# Patient Record
Sex: FEMALE | Race: OTHER
[De-identification: names, ages, dates, MRNs, and addresses within clinical notes are randomized per-mention and may not be internally consistent; named-entity substitution may affect disease eponyms.]

---

## 2019-04-24 ENCOUNTER — HOSPITAL ENCOUNTER (INPATIENT)
Dept: HOSPITAL 54 - DS | Age: 59
LOS: 2 days | Discharge: HOME HEALTH | DRG: 467 | End: 2019-04-26
Attending: SPECIALIST | Admitting: SPECIALIST
Payer: COMMERCIAL

## 2019-04-24 VITALS — HEIGHT: 63 IN | WEIGHT: 149 LBS | BODY MASS INDEX: 26.4 KG/M2

## 2019-04-24 VITALS — SYSTOLIC BLOOD PRESSURE: 126 MMHG | DIASTOLIC BLOOD PRESSURE: 68 MMHG

## 2019-04-24 VITALS — DIASTOLIC BLOOD PRESSURE: 71 MMHG | SYSTOLIC BLOOD PRESSURE: 124 MMHG

## 2019-04-24 VITALS — DIASTOLIC BLOOD PRESSURE: 71 MMHG | SYSTOLIC BLOOD PRESSURE: 126 MMHG

## 2019-04-24 DIAGNOSIS — D72.829: ICD-10-CM

## 2019-04-24 DIAGNOSIS — M17.11: Primary | ICD-10-CM

## 2019-04-24 DIAGNOSIS — D68.59: ICD-10-CM

## 2019-04-24 DIAGNOSIS — E78.5: ICD-10-CM

## 2019-04-24 DIAGNOSIS — E11.9: ICD-10-CM

## 2019-04-24 DIAGNOSIS — E87.6: ICD-10-CM

## 2019-04-24 PROCEDURE — G0378 HOSPITAL OBSERVATION PER HR: HCPCS

## 2019-04-24 PROCEDURE — L1830 KO IMMOB CANVAS LONG PRE OTS: HCPCS

## 2019-04-24 PROCEDURE — A4217 STERILE WATER/SALINE, 500 ML: HCPCS

## 2019-04-24 PROCEDURE — A6402 STERILE GAUZE <= 16 SQ IN: HCPCS

## 2019-04-24 PROCEDURE — C1713 ANCHOR/SCREW BN/BN,TIS/BN: HCPCS

## 2019-04-24 RX ADMIN — INSULIN HUMAN PRN UNIT: 100 INJECTION, SOLUTION PARENTERAL at 22:47

## 2019-04-24 RX ADMIN — DEXTROSE MONOHYDRATE SCH MLS/HR: 50 INJECTION, SOLUTION INTRAVENOUS at 20:20

## 2019-04-24 RX ADMIN — Medication PRN MG: at 19:09

## 2019-04-24 RX ADMIN — Medication SCH MG: at 18:38

## 2019-04-24 RX ADMIN — SODIUM CHLORIDE, SODIUM LACTATE, POTASSIUM CHLORIDE, AND CALCIUM CHLORIDE PRN MLS/HR: .6; .31; .03; .02 INJECTION, SOLUTION INTRAVENOUS at 19:07

## 2019-04-24 RX ADMIN — HYDROCODONE BITARTRATE AND ACETAMINOPHEN PRN EA: 10; 325 TABLET ORAL at 17:26

## 2019-04-24 RX ADMIN — Medication SCH EACH: at 21:43

## 2019-04-24 RX ADMIN — FAMOTIDINE SCH MG: 20 TABLET, FILM COATED ORAL at 20:27

## 2019-04-24 RX ADMIN — Medication PRN MG: at 22:21

## 2019-04-24 NOTE — NUR
TELE RN CLOSING NOTES

PT IS AWAKE ORIENTED X4. RESPIRATION IS EQUAL, CLEAR AND UNLABORED. IV IS INTACT, PATENT AND 
INFUSING WELL. HAYWOOD CATHERER IS PATENT, INTACT AND DRAINING. PAIN HAS BEEN MANAGED WITH 
PAIN MANAGEMENT AND REPOSITIONING. ALL MEDS HAS BEEN GIVEN.  'S PT IS AT THE BED 
SIDE. CALL LIGHT IS WITHIN REACH. BED AT LOW POSITION.

## 2019-04-24 NOTE — NUR
MS/RN OPENING NOTES

RECEIVED PATIENT IN BED, AWAKE,ALERT, WITH GRIMACE, REPORTED PAIN OF 8/10, LAST MEDICATION 
GIVEN LAST HOUR. BUT ABLE TO TOLERATE PER PATIENT, CALM AND COOPERATIVE, RESPIRATIONS EVEN 
AND UNLABORED. BED LOCKED CALL LIGHTS WITHIN REACH, WILL MONITOR.

## 2019-04-24 NOTE — NUR
RN TELE NOTES

RECEIVED PT FROM OR STAFF VIA BED. PT IS AWAKE, ALERT AND ORIENTED X4. RESPIRATION IS 
NORMAL, EQUAL AND UNLABORED. ROOM SET-UP ORIENTATION PROVIDED. VERBALIZE UNDERSTANDING. PT 
FAMILY AT THE BED SIDE. NO PAIN PRESENT AT THIS TIME. BED AT LOW POSITION AND CALL LIGHT IS 
WITHIN REACH. WILL CONTINUE TO MONITOR.

## 2019-04-24 NOTE — NUR
MS/RN NOTES

PAIN REPORTED ADMINISTERED IM MORPHINE AS PRESCRIBED 4MG TO MONITOR.ALERT, ORIENTED X3.

## 2019-04-25 VITALS — DIASTOLIC BLOOD PRESSURE: 56 MMHG | SYSTOLIC BLOOD PRESSURE: 95 MMHG

## 2019-04-25 VITALS — DIASTOLIC BLOOD PRESSURE: 55 MMHG | SYSTOLIC BLOOD PRESSURE: 113 MMHG

## 2019-04-25 VITALS — DIASTOLIC BLOOD PRESSURE: 56 MMHG | SYSTOLIC BLOOD PRESSURE: 106 MMHG

## 2019-04-25 LAB
ALBUMIN SERPL BCP-MCNC: 3.1 G/DL (ref 3.4–5)
ALP SERPL-CCNC: 59 U/L (ref 46–116)
ALT SERPL W P-5'-P-CCNC: 25 U/L (ref 12–78)
AST SERPL W P-5'-P-CCNC: 18 U/L (ref 15–37)
BASOPHILS # BLD AUTO: 0 /CMM (ref 0–0.2)
BASOPHILS NFR BLD AUTO: 0.3 % (ref 0–2)
BILIRUB SERPL-MCNC: 0.4 MG/DL (ref 0.2–1)
BUN SERPL-MCNC: 10 MG/DL (ref 7–18)
CALCIUM SERPL-MCNC: 8.3 MG/DL (ref 8.5–10.1)
CHLORIDE SERPL-SCNC: 105 MMOL/L (ref 98–107)
CHOLEST SERPL-MCNC: 141 MG/DL (ref ?–200)
CO2 SERPL-SCNC: 24 MMOL/L (ref 21–32)
CREAT SERPL-MCNC: 0.6 MG/DL (ref 0.6–1.3)
EOSINOPHIL NFR BLD AUTO: 0.1 % (ref 0–6)
GLUCOSE SERPL-MCNC: 148 MG/DL (ref 74–106)
HCT VFR BLD AUTO: 37 % (ref 33–45)
HDLC SERPL-MCNC: 42 MG/DL (ref 40–60)
HGB BLD-MCNC: 12.5 G/DL (ref 11.5–14.8)
LDLC SERPL DIRECT ASSAY-MCNC: 96 MG/DL (ref 0–99)
LYMPHOCYTES NFR BLD AUTO: 18.8 % (ref 20–44)
LYMPHOCYTES NFR BLD AUTO: 2.5 /CMM (ref 0.8–4.8)
MAGNESIUM SERPL-MCNC: 2.2 MG/DL (ref 1.8–2.4)
MCHC RBC AUTO-ENTMCNC: 34 G/DL (ref 31–36)
MCV RBC AUTO: 89 FL (ref 82–100)
MONOCYTES NFR BLD AUTO: 1.6 /CMM (ref 0.1–1.3)
MONOCYTES NFR BLD AUTO: 12.1 % (ref 2–12)
NEUTROPHILS # BLD AUTO: 9.1 /CMM (ref 1.8–8.9)
NEUTROPHILS NFR BLD AUTO: 68.7 % (ref 43–81)
PHOSPHATE SERPL-MCNC: 3.5 MG/DL (ref 2.5–4.9)
PLATELET # BLD AUTO: 263 /CMM (ref 150–450)
POTASSIUM SERPL-SCNC: 3.8 MMOL/L (ref 3.5–5.1)
PROT SERPL-MCNC: 6.6 G/DL (ref 6.4–8.2)
RBC # BLD AUTO: 4.16 MIL/UL (ref 4–5.2)
SODIUM SERPL-SCNC: 137 MMOL/L (ref 136–145)
TRIGL SERPL-MCNC: 83 MG/DL (ref 30–150)
TSH SERPL DL<=0.005 MIU/L-ACNC: 1.69 UIU/ML (ref 0.36–3.74)
WBC NRBC COR # BLD AUTO: 13.3 K/UL (ref 4.3–11)

## 2019-04-25 RX ADMIN — Medication SCH EACH: at 21:43

## 2019-04-25 RX ADMIN — HYDROCODONE BITARTRATE AND ACETAMINOPHEN PRN EA: 10; 325 TABLET ORAL at 05:41

## 2019-04-25 RX ADMIN — DEXTROSE MONOHYDRATE SCH MLS/HR: 50 INJECTION, SOLUTION INTRAVENOUS at 04:51

## 2019-04-25 RX ADMIN — Medication SCH EACH: at 17:19

## 2019-04-25 RX ADMIN — Medication SCH MG: at 16:42

## 2019-04-25 RX ADMIN — INSULIN HUMAN PRN UNIT: 100 INJECTION, SOLUTION PARENTERAL at 21:49

## 2019-04-25 RX ADMIN — Medication PRN MG: at 18:13

## 2019-04-25 RX ADMIN — Medication PRN MG: at 05:31

## 2019-04-25 RX ADMIN — FAMOTIDINE SCH MG: 20 TABLET, FILM COATED ORAL at 09:08

## 2019-04-25 RX ADMIN — FAMOTIDINE SCH MG: 20 TABLET, FILM COATED ORAL at 21:43

## 2019-04-25 RX ADMIN — Medication SCH EACH: at 05:49

## 2019-04-25 RX ADMIN — HYDROCODONE BITARTRATE AND ACETAMINOPHEN PRN EA: 10; 325 TABLET ORAL at 16:43

## 2019-04-25 RX ADMIN — INSULIN HUMAN PRN UNIT: 100 INJECTION, SOLUTION PARENTERAL at 18:33

## 2019-04-25 RX ADMIN — Medication SCH EACH: at 13:16

## 2019-04-25 RX ADMIN — Medication PRN MG: at 01:28

## 2019-04-25 RX ADMIN — SODIUM CHLORIDE, SODIUM LACTATE, POTASSIUM CHLORIDE, AND CALCIUM CHLORIDE PRN MLS/HR: .6; .31; .03; .02 INJECTION, SOLUTION INTRAVENOUS at 14:00

## 2019-04-25 RX ADMIN — RIVAROXABAN SCH MG: 10 TABLET, FILM COATED ORAL at 16:43

## 2019-04-25 RX ADMIN — INSULIN HUMAN PRN UNIT: 100 INJECTION, SOLUTION PARENTERAL at 06:56

## 2019-04-25 RX ADMIN — Medication SCH MG: at 09:08

## 2019-04-25 RX ADMIN — HYDROCODONE BITARTRATE AND ACETAMINOPHEN PRN EA: 10; 325 TABLET ORAL at 13:16

## 2019-04-25 RX ADMIN — HYDROCODONE BITARTRATE AND ACETAMINOPHEN PRN EA: 10; 325 TABLET ORAL at 09:10

## 2019-04-25 NOTE — NUR
324-1

MS/RN NOTES

PATIENT IN BED, HOB ELEVATED, SLEPT INTERMITENTLY, ON PAIN MANAGMENT MONITORING, PROVIDED 
SNACKS

KEPT COMFORTABLE, RESPIRATIONS EVEN AND UNLABORED, ON 2 LITER OXYGEN VIA NC. WILL MONITOR.

## 2019-04-25 NOTE — NUR
POD#1 s/p right TKA. Met with patient and spouse Evens at bedside. She lives with her spouse 
in a single level dwelling in [place]. Prior to hospitalization, she was ambulatory 
and independent with adl's. Offered ARU for rehab but patient want to go home when 
discharge. Her  is her primary source of support and caregiver. Spoke with Agnes at 
[facility] [phone], patient DME delivered to home already - walker, shower 
chair, raised toilet seat,and cooling therapy.CPM machine will be delivered to home on day 
of discharge according to Agnes. Homehealth was also pre-arranged/set up with [facility] 
[phone]. Homehealth to start service a day after discharge. Patient spouse will provide 
transportation to home when discharge. 










-------------------------------------------------------------------------------

Addendum: 04/25/19 at 2312 by ROWENA SANTOS RN

-------------------------------------------------------------------------------

Amended: Links added.

## 2019-04-25 NOTE — NUR
MS LVN NOTES

 PT RESTING BUT AROUSE TO TOUCH ROUTINE MEDS GIVEN AND BLOOD SUGAR CHECKED DONE 151 .  2 
UNITS OF INSULIN GIVEN JORY SQ AS ORDERED. SNACKS ALSO SERVED. KEPT HER WARM AND COMFORTABLE 
AT ALL TIMES. WILL CONTINUE MONITORING.PLACE CALL LIGHT AT REACH.

## 2019-04-25 NOTE — NUR
MS/RN NOTES

PAIN OF 8/10 TO MONITOR, WITH NOCO PO BREAK THROUGH  MG PO. MD WINKLER MADE AWARE AND TO 
KEEP MONITORING PATIETN AND GIVE AS NEEDED PAIN MEDICATION FOR RELIEF IN TIME.

## 2019-04-25 NOTE — NUR
HD RN

RECEIVED ON BED, AWAKE,ALERT,ORIENTED X 4,NOT IN ANY FORM OF DISTRESS, RESPIRATIONS EVEN AND 
UNLABORED,NO SOB NOTED, LUNGS ARE DIMINISHED, ABDOMEN SOFT,POSITIVE BOWEL SOUNDS,DENIES PAIN 
AT THIS TIME,ALL NEEDS ATTENDED.

## 2019-04-25 NOTE — NUR
MS LVN INITIAL NOTES

 SEEN PT IN BED AWAKE AND ALERT TALKING TO HER  AT THE BEDSIDE. DENIES ANY PAIN OR 
ANY DISCOMFORT. WITH KNEE IMMOBILIZER ON HER RIGHT LOWER LEGS. WITH ICE PACK FOR PT COMFORT. 
LR  ML/HR INFUSING ON HER LEFT HAND PATENT AND INTACT. ENCOURAGE PT TO USED THE CALL 
LIGHT IF SHE NEEDS SOME HELPED. KEPT HER WARM AND COMFORTABLE AT ALL TIMES. PLACE CALL LIGHT 
AT REACH. WILL CONTINUE MONITORING.

## 2019-04-26 VITALS — DIASTOLIC BLOOD PRESSURE: 61 MMHG | SYSTOLIC BLOOD PRESSURE: 116 MMHG

## 2019-04-26 VITALS — SYSTOLIC BLOOD PRESSURE: 115 MMHG | DIASTOLIC BLOOD PRESSURE: 59 MMHG

## 2019-04-26 LAB
BASOPHILS # BLD AUTO: 0.1 /CMM (ref 0–0.2)
BASOPHILS NFR BLD AUTO: 0.4 % (ref 0–2)
BUN SERPL-MCNC: 9 MG/DL (ref 7–18)
CALCIUM SERPL-MCNC: 8.6 MG/DL (ref 8.5–10.1)
CHLORIDE SERPL-SCNC: 105 MMOL/L (ref 98–107)
CO2 SERPL-SCNC: 23 MMOL/L (ref 21–32)
CREAT SERPL-MCNC: 0.5 MG/DL (ref 0.6–1.3)
EOSINOPHIL NFR BLD AUTO: 0 % (ref 0–6)
GLUCOSE SERPL-MCNC: 130 MG/DL (ref 74–106)
HCT VFR BLD AUTO: 37 % (ref 33–45)
HGB BLD-MCNC: 12.4 G/DL (ref 11.5–14.8)
LYMPHOCYTES NFR BLD AUTO: 1.9 /CMM (ref 0.8–4.8)
LYMPHOCYTES NFR BLD AUTO: 13.4 % (ref 20–44)
MCHC RBC AUTO-ENTMCNC: 34 G/DL (ref 31–36)
MCV RBC AUTO: 89 FL (ref 82–100)
MONOCYTES NFR BLD AUTO: 14.3 % (ref 2–12)
MONOCYTES NFR BLD AUTO: 2 /CMM (ref 0.1–1.3)
NEUTROPHILS # BLD AUTO: 10.3 /CMM (ref 1.8–8.9)
NEUTROPHILS NFR BLD AUTO: 71.9 % (ref 43–81)
PLATELET # BLD AUTO: 242 /CMM (ref 150–450)
POTASSIUM SERPL-SCNC: 3.4 MMOL/L (ref 3.5–5.1)
RBC # BLD AUTO: 4.14 MIL/UL (ref 4–5.2)
SODIUM SERPL-SCNC: 140 MMOL/L (ref 136–145)
WBC NRBC COR # BLD AUTO: 14.3 K/UL (ref 4.3–11)

## 2019-04-26 RX ADMIN — Medication SCH EACH: at 07:42

## 2019-04-26 RX ADMIN — INSULIN HUMAN PRN UNIT: 100 INJECTION, SOLUTION PARENTERAL at 11:24

## 2019-04-26 RX ADMIN — INSULIN HUMAN PRN UNIT: 100 INJECTION, SOLUTION PARENTERAL at 08:12

## 2019-04-26 RX ADMIN — Medication SCH MG: at 16:02

## 2019-04-26 RX ADMIN — HYDROCODONE BITARTRATE AND ACETAMINOPHEN PRN EA: 10; 325 TABLET ORAL at 13:55

## 2019-04-26 RX ADMIN — Medication PRN MG: at 00:30

## 2019-04-26 RX ADMIN — Medication SCH EACH: at 11:22

## 2019-04-26 RX ADMIN — FAMOTIDINE SCH MG: 20 TABLET, FILM COATED ORAL at 08:12

## 2019-04-26 RX ADMIN — Medication SCH MG: at 08:12

## 2019-04-26 RX ADMIN — HYDROCODONE BITARTRATE AND ACETAMINOPHEN PRN EA: 10; 325 TABLET ORAL at 05:48

## 2019-04-26 RX ADMIN — HYDROCODONE BITARTRATE AND ACETAMINOPHEN PRN EA: 10; 325 TABLET ORAL at 17:06

## 2019-04-26 RX ADMIN — RIVAROXABAN SCH MG: 10 TABLET, FILM COATED ORAL at 16:04

## 2019-04-26 RX ADMIN — SODIUM CHLORIDE, SODIUM LACTATE, POTASSIUM CHLORIDE, AND CALCIUM CHLORIDE PRN MLS/HR: .6; .31; .03; .02 INJECTION, SOLUTION INTRAVENOUS at 02:25

## 2019-04-26 RX ADMIN — Medication SCH EACH: at 17:10

## 2019-04-26 RX ADMIN — HYDROCODONE BITARTRATE AND ACETAMINOPHEN PRN EA: 10; 325 TABLET ORAL at 09:40

## 2019-04-26 NOTE — NUR
MS RN DISCHARGE NOTES



PT TO DISCHARGE TO HOME WITH HOME HEALTH. CMP MACHINE ARRANGED BY CM TO BE DELIVERED AT 
HOME. FAMILY MEMBER CONFIRMED COMPANY WHO WILL PROVIDE/DELIVER CPM CALLED HIM ALREADY. PT 
REQUESTED NORCO PRN PAIN MEDICINE BEFORE LEAVING THE UNIT, GIVEN AS ORDERED, PAIN RATED 8/10 
TO RIGHT KNEE. PT REFUSED FOR DRESSINGS TO BE CHANGED AND PHOTO TAKEN, EXPLAINED FACILITY 
PROTOCOL, PT STATED "NO IT'S OKAY". REVIEWED AND SIGNED DISCHARGE INSTRUCTIONS AND INVENTORY 
LIST BY PT, WITNESSED BY 1 FAMILY MEMBER. PT CONFIRMED ALL HER BELONGINGS ARE WITH HER. 
PRESCRIPTIONS GIVEN TO PT. PT HAPPY WITH THE SERVICE PROVIDED TO HER. CN AND NP AP AWARE OF 
DISCHARGE. PT LEFT THE UNIT AT 1515 ACCOMPANIED BY 1 FAMILY MEMBER, ASSISTED BY 1CNA VIA W/C 
TO THE LOBBY.

## 2019-04-26 NOTE — NUR
ms lvn closing notes

 pt resting with eyes closed but arouse to touch. no signs of nay acute distress noted. all 
due meds given and all needs met. ivf still infusing on her left hand patent and intact. 
slept well and stable mauricio the night except the pain . kept her warm and comfortable at all 
times. place call light at reach. endorse to am nurse for continuity of care.

## 2019-04-26 NOTE — NUR
MS LVN NOTES

 PT WOKE UP AND BLOOD SUGAR CHECKED DONE 137. NORCO TABLET GIVEN FOR HER MILD PAIN ON HER 
RIGHT KNEE APPLIED A NEW ICE PACK AS WELL.

## 2019-04-26 NOTE — NUR
MS RN OPENING NOTES



RECEIVED PT SLEEPING IN BED, EASILY AROUSED. A/O 3-4. PT ON O2 AT 2LPM VIA NC, WITH NO ACUTE 
RESPIRATORY DISTRESS NOTED. PT DENIES PAIN. PT DENIES ANY QUESTIONS OR CONCERNS AT THIS 
MOMENT. IVF LR AT 75ML/HR TO LHAND G20, INTACT AND INFUSING WELL. PT KEPT COMFORTABLE. PT'S 
BED KEPT IN LOWEST, LOCKED, POSITION WITH SRX2. CALL LIGHT WITHIN REACH. WILL CONTINUE PLAN 
OF CARE.

## 2019-04-26 NOTE — NUR
MS LVN NOTES

 PT SLEEPING COMFORTABLY IN BED WITHOUT ANY DISCOMFORT NOTED, BREATHING EVEN AND 
NON-LABORED. IVF LR INFUSING AT THIS TIME. KEPT HER WARM AND COMFORTABLE AT ALL TIMES. WILL 
CONTINUE MONITORING. CALL LIGHT AT REACH.

## 2019-04-26 NOTE — NUR
MS LVN NOTES PAIN MGT. 

 WHEN DOING MY ROUNDS PT JUST WOKE UP AND COMPLAINING OF PAIN ON HIS RIGHT KNEE. FACIAL 
GRIMACE AND PAIN 8/10. SPOKE TO HER ANOTHER NURSE WILL GIVE HER PAIN MEDS JORY IVP AS SHE 
REQUESTED . ICE PACK ASLO APPLIED FOR PT COMFORT. PLACE CALL LIGHT AT REACH.